# Patient Record
Sex: FEMALE | Race: WHITE | NOT HISPANIC OR LATINO | Employment: STUDENT | URBAN - METROPOLITAN AREA
[De-identification: names, ages, dates, MRNs, and addresses within clinical notes are randomized per-mention and may not be internally consistent; named-entity substitution may affect disease eponyms.]

---
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
## 2023-06-05 PROBLEM — Z34.90 TERM PREGNANCY: Status: ACTIVE | Noted: 2023-06-05

## 2023-06-05 PROBLEM — Z3A.38 38 WEEKS GESTATION OF PREGNANCY: Status: ACTIVE | Noted: 2023-06-05

## (undated) DEVICE — GAUZE SPONGE 4X4 12PLY

## (undated) DEVICE — PAD ABD 8X10 STERILE

## (undated) DEVICE — SUT 3-0 VICRYL / RB-1

## (undated) DEVICE — SEE MEDLINE ITEM 157117

## (undated) DEVICE — BRIEF MESH LARGE

## (undated) DEVICE — SEE MEDLINE ITEM 154981

## (undated) DEVICE — GOWN SURGICAL X-LARGE

## (undated) DEVICE — TRAY MINOR GEN SURG

## (undated) DEVICE — ELECTRODE NEEDLE 2.8IN

## (undated) DEVICE — DRAPE OPTIMA MAJOR PEDIATRIC

## (undated) DEVICE — SUT ETHILON 3-0 PS2 18 BLK